# Patient Record
Sex: MALE | Race: WHITE | NOT HISPANIC OR LATINO | Employment: FULL TIME | URBAN - METROPOLITAN AREA
[De-identification: names, ages, dates, MRNs, and addresses within clinical notes are randomized per-mention and may not be internally consistent; named-entity substitution may affect disease eponyms.]

---

## 2021-03-31 ENCOUNTER — RECORDS - HEALTHEAST (OUTPATIENT)
Dept: LAB | Facility: CLINIC | Age: 56
End: 2021-03-31

## 2021-03-31 LAB
CHOLEST SERPL-MCNC: 201 MG/DL
FASTING STATUS PATIENT QL REPORTED: NO
HDLC SERPL-MCNC: 40 MG/DL
LDLC SERPL CALC-MCNC: 126 MG/DL
PSA SERPL-MCNC: 1.3 NG/ML (ref 0–3.5)
TRIGL SERPL-MCNC: 173 MG/DL

## 2021-10-05 ENCOUNTER — THERAPY VISIT (OUTPATIENT)
Dept: PHYSICAL THERAPY | Facility: CLINIC | Age: 56
End: 2021-10-05
Payer: COMMERCIAL

## 2021-10-05 DIAGNOSIS — M54.6 BILATERAL THORACIC BACK PAIN: ICD-10-CM

## 2021-10-05 DIAGNOSIS — M54.2 CERVICALGIA: ICD-10-CM

## 2021-10-05 DIAGNOSIS — M54.50 LUMBAGO: Primary | ICD-10-CM

## 2021-10-05 PROCEDURE — 97161 PT EVAL LOW COMPLEX 20 MIN: CPT | Mod: GP | Performed by: PHYSICAL THERAPIST

## 2021-10-05 PROCEDURE — 97112 NEUROMUSCULAR REEDUCATION: CPT | Mod: GP | Performed by: PHYSICAL THERAPIST

## 2021-10-05 PROCEDURE — 97140 MANUAL THERAPY 1/> REGIONS: CPT | Mod: GP | Performed by: PHYSICAL THERAPIST

## 2021-10-05 NOTE — LETTER
[facility]  [address] [place] [address]  [phone]    2021    Re: Lio Herbert   :   1965  MRN:  [id number]   REFERRING PHYSICIAN:   Levar HERNANDEZ [facility]    Date of Initial Evaluation: 10/5/2021  Visits:  Rxs Used: 1  Reason for Referral:     Cervicalgia  Lumbago  Bilateral thoracic back pain    EVALUATION SUMMARY    Physical Therapy Initial Evaluation  2021     Precautions/Restrictions/MD instructions: PT eval and treat.      Subjective:   Date of Onset: MD order on 9/10/21  C/C: pain and tightness from the base of his neck out to his lateral shoulders and extending into his mid/lower back.  Quality of pain is dull and aching. Pains are described as intermittent in nature. Pain is worse: dependent on activities. Pain is rated 1/10.   History of symptoms: Pains began gradually as the result of taking a few road trips. Since onset, symptoms are improving. Previous episodes: none  Worsened by: driving greater than 1 hour, standing or sitting for greater than 1 hour.   Alleviated by: Movement.    General health as reported by patient: good  Pertinent medical/surgical history: depression, numbness - history in his left heel.    Imaging: none. Current occupational status: artist, teacher, tech director, . Patient's goals are: decrease pain, improve tolerance to sitting, driving, staying in one position. Return to MD:  PRN.   Objective:  CERVICAL:  Re: Lio Herbert   :   1965      Neurological:    Motor Deficit:  Myotomes L R   C4 (shoulder elevation) 5/5 5/5   C5 (shoulder abduction) 5/5 5/5   C6 (elbow flexion) 5/5 5/5   C7 (elbow extension) 5/5 5/5   C8 (thumb extension) 5/5 5/5   T1 (finger add/abd) 5/5 5/5    Strength (lb) WNL WNL     Sensory Deficit, Reflexes, Dural Signs:   Intact to light touch sensation in all UE dermatomes. Brachioradialis,  triceps and biceps reflex 1+ B.    AROM: (Major, Moderate, Minimal or Nil loss)  Movement Loss Cornelio Mod Min Nil Pain   Protrusion    x    Flexion   x     Retraction   x     Extension  x      Left Rotation  62      Right Rotation    72     Left Side Bending   x     Right Side bending   x            Thoracic AROM: (Major, Moderate, Minimal or Nil loss)  Movement Loss Cornelio Mod Min Nil Pain   Flexion   x     Extension x       Rotation L   x     Rotation R  x            AROM: (Major, Moderate, Minimal or Nil loss)  Movement Loss Cornelio Mod Min Nil Pain   Flexion   x     Extension  x      Side Gliding L    x    Side Gliding R    x             Re: Lio Herbert   :   1965    Assessment/Plan:    The patient is a 56 year old male with chief complaint of low back, neck and upper back pain.    The patient has the following significant findings with corresponding treatment plan.  Diagnosis 1:  Neck, back and thoracic pain    Pain -  hot/cold therapy, manual therapy, splint/taping/bracing/orthotics, education, directional preference exercise and home program  Decreased ROM/flexibility - manual therapy and therapeutic exercise  Decreased proprioception - neuro re-education and therapeutic activities  Impaired muscle performance - neuro re-education  Decreased function - therapeutic activities  Impaired posture - neuro re-education      Therapy Evaluation Codes:      Cumulative Therapy Evaluation is: Low complexity.    Previous and current functional limitations:  (See Goal Flow Sheet for this information)    Short term and Long term goals: (See Goal Flow Sheet for this information)     Communication ability:  Patient appears to be able to clearly communicate and understand verbal and written communication and follow directions correctly.  Treatment Explanation - The following has been discussed with the patient: RX ordered/plan of care, anticipated outcomes, and possible risks and side effects.  This patient would benefit  from PT intervention to resume normal activities.   Rehab potential is good.    Frequency:  1 X week, once daily  Duration:  for 4 weeks  Discharge Plan: Achieve all LTGs, be independent in home treatment program, and reach maximal therapeutic benefit.    Thank you for your referral.    INQUIRIES  Therapist: Alia Meza, PT, DPT, [facility] [address] [place] [address]  Phone: [phone]  Fax: [phone]

## 2021-10-05 NOTE — PROGRESS NOTES
Physical Therapy Initial Evaluation  October 5, 2021     Precautions/Restrictions/MD instructions: PT eval and treat.      Subjective:   Date of Onset: MD order on 9/10/21  C/C: pain and tightness from the base of his neck out to his lateral shoulders and extending into his mid/lower back.  Quality of pain is dull and aching. Pains are described as intermittent in nature. Pain is worse: dependent on activities. Pain is rated 1/10.   History of symptoms: Pains began gradually as the result of taking a few road trips. Since onset, symptoms are improving. Previous episodes: none  Worsened by: driving greater than 1 hour, standing or sitting for greater than 1 hour.   Alleviated by: Movement.    General health as reported by patient: good  Pertinent medical/surgical history: depression, numbness - history in his left heel.    Imaging: none. Current occupational status: artist, teacher, tech director, . Patient's goals are: decrease pain, improve tolerance to sitting, driving, staying in one position. Return to MD:  PRN.     Objective:  CERVICAL:        Neurological:    Motor Deficit:  Myotomes L R   C4 (shoulder elevation) 5/5 5/5   C5 (shoulder abduction) 5/5 5/5   C6 (elbow flexion) 5/5 5/5   C7 (elbow extension) 5/5 5/5   C8 (thumb extension) 5/5 5/5   T1 (finger add/abd) 5/5 5/5    Strength (lb) WNL WNL     Sensory Deficit, Reflexes, Dural Signs:   Intact to light touch sensation in all UE dermatomes. Brachioradialis, triceps and biceps reflex 1+ B.    AROM: (Major, Moderate, Minimal or Nil loss)  Movement Loss Cornelio Mod Min Nil Pain   Protrusion    x    Flexion   x     Retraction   x     Extension  x      Left Rotation  62      Right Rotation    72     Left Side Bending   x     Right Side bending   x            Thoracic AROM: (Major, Moderate, Minimal or Nil loss)  Movement Loss Cornelio Mod Min Nil Pain   Flexion   x     Extension x       Rotation L   x     Rotation R  x            AROM: (Major, Moderate,  Minimal or Nil loss)  Movement Loss Corneloi Mod Min Nil Pain   Flexion   x     Extension  x      Side Gliding L    x    Side Gliding R    x       Assessment/Plan:    The patient is a 56 year old male with chief complaint of low back, neck and upper back pain.    The patient has the following significant findings with corresponding treatment plan.  Diagnosis 1:  Neck, back and thoracic pain    Pain -  hot/cold therapy, manual therapy, splint/taping/bracing/orthotics, education, directional preference exercise and home program  Decreased ROM/flexibility - manual therapy and therapeutic exercise  Decreased proprioception - neuro re-education and therapeutic activities  Impaired muscle performance - neuro re-education  Decreased function - therapeutic activities  Impaired posture - neuro re-education           Therapy Evaluation Codes:      Cumulative Therapy Evaluation is: Low complexity.    Previous and current functional limitations:  (See Goal Flow Sheet for this information)    Short term and Long term goals: (See Goal Flow Sheet for this information)     Communication ability:  Patient appears to be able to clearly communicate and understand verbal and written communication and follow directions correctly.  Treatment Explanation - The following has been discussed with the patient: RX ordered/plan of care, anticipated outcomes, and possible risks and side effects.  This patient would benefit from PT intervention to resume normal activities.   Rehab potential is good.    Frequency:  1 X week, once daily  Duration:  for 4 weeks  Discharge Plan: Achieve all LTGs, be independent in home treatment program, and reach maximal therapeutic benefit.    Please refer to the daily flowsheet for treatment today, total treatment time and time spent performing 1:1 timed codes.

## 2021-10-20 ENCOUNTER — THERAPY VISIT (OUTPATIENT)
Dept: PHYSICAL THERAPY | Facility: CLINIC | Age: 56
End: 2021-10-20
Payer: COMMERCIAL

## 2021-10-20 DIAGNOSIS — M54.6 BILATERAL THORACIC BACK PAIN: ICD-10-CM

## 2021-10-20 DIAGNOSIS — M54.50 LUMBAGO: ICD-10-CM

## 2021-10-20 DIAGNOSIS — M54.2 CERVICALGIA: ICD-10-CM

## 2021-10-20 PROCEDURE — 97112 NEUROMUSCULAR REEDUCATION: CPT | Mod: GP | Performed by: PHYSICAL THERAPIST

## 2021-10-20 PROCEDURE — 97110 THERAPEUTIC EXERCISES: CPT | Mod: GP | Performed by: PHYSICAL THERAPIST

## 2021-11-03 ENCOUNTER — THERAPY VISIT (OUTPATIENT)
Dept: PHYSICAL THERAPY | Facility: CLINIC | Age: 56
End: 2021-11-03
Payer: COMMERCIAL

## 2021-11-03 DIAGNOSIS — M54.50 LUMBAGO: ICD-10-CM

## 2021-11-03 DIAGNOSIS — M54.2 CERVICALGIA: ICD-10-CM

## 2021-11-03 DIAGNOSIS — M54.6 BILATERAL THORACIC BACK PAIN: ICD-10-CM

## 2021-11-03 PROCEDURE — 97112 NEUROMUSCULAR REEDUCATION: CPT | Mod: GP | Performed by: PHYSICAL THERAPIST

## 2021-11-03 PROCEDURE — 97110 THERAPEUTIC EXERCISES: CPT | Mod: GP | Performed by: PHYSICAL THERAPIST

## 2021-11-03 PROCEDURE — 97140 MANUAL THERAPY 1/> REGIONS: CPT | Mod: GP | Performed by: PHYSICAL THERAPIST

## 2021-12-12 ENCOUNTER — HEALTH MAINTENANCE LETTER (OUTPATIENT)
Age: 56
End: 2021-12-12

## 2022-03-04 PROBLEM — M54.50 LUMBAGO: Status: RESOLVED | Noted: 2021-10-05 | Resolved: 2022-03-04

## 2022-03-04 PROBLEM — M54.6 BILATERAL THORACIC BACK PAIN: Status: RESOLVED | Noted: 2021-10-05 | Resolved: 2022-03-04

## 2022-03-04 PROBLEM — M54.2 CERVICALGIA: Status: RESOLVED | Noted: 2021-10-05 | Resolved: 2022-03-04

## 2022-03-04 NOTE — PROGRESS NOTES
Discharge Note    Progress reporting period is from initial evaluation date (please see noted date below) to Nov 3, 2021.  No linked episodes      Lio failed to follow up and current status is unknown.  Please see information below for last relevant information on current status.  Patient seen for 3 visits.    SUBJECTIVE  Subjective changes noted by patient:  Upper back and lower back doing well except after lifting and moving a ladder. He felt like the exercises helped to get it under control if it had flared up.  He reports a 65% improvement in symptoms overall.   .  Current pain level is 0/10.     Previous pain level was  3/10.   Changes in function:  Yes (See Goal flowsheet attached for changes in current functional level)  Adverse reaction to treatment or activity: None    OBJECTIVE  Changes noted in objective findings: Flexion/Extension 100%,  L rotation 76 degrees, R 79.   Thoracic: flexion 100% EXT , L rotation 100% R rotation 100%. KWADWO 4%     ASSESSMENT/PLAN  Diagnosis: neck, low back, and thoracic pain   Updated problem list and treatment plan:   Pain - HEP  Decreased ROM/flexibility - HEP  STG/LTGs have been met or progress has been made towards goals:  Yes, please see goal flowsheet for most current information  Assessment of Progress: current status is unknown.    Last current status: Pt is progressing as expected   Self Management Plans:  HEP  I have re-evaluated this patient and find that the nature, scope, duration and intensity of the therapy is appropriate for the medical condition of the patient.  Lio continues to require the following intervention to meet STG and LTG's:  HEP.    Recommendations:  Discharge with current home program.  Patient to follow up with MD as needed.    Please refer to the daily flowsheet for treatment today, total treatment time and time spent performing 1:1 timed codes.

## 2022-05-13 ENCOUNTER — LAB REQUISITION (OUTPATIENT)
Dept: LAB | Facility: CLINIC | Age: 57
End: 2022-05-13

## 2022-05-13 DIAGNOSIS — Z13.1 ENCOUNTER FOR SCREENING FOR DIABETES MELLITUS: ICD-10-CM

## 2022-05-13 DIAGNOSIS — E78.2 MIXED HYPERLIPIDEMIA: ICD-10-CM

## 2022-05-13 LAB
CHOLEST SERPL-MCNC: 229 MG/DL
FASTING STATUS PATIENT QL REPORTED: ABNORMAL
FASTING STATUS PATIENT QL REPORTED: NORMAL
GLUCOSE BLD-MCNC: 101 MG/DL (ref 70–125)
HDLC SERPL-MCNC: 50 MG/DL
LDLC SERPL CALC-MCNC: 158 MG/DL
TRIGL SERPL-MCNC: 107 MG/DL

## 2022-05-13 PROCEDURE — 82947 ASSAY GLUCOSE BLOOD QUANT: CPT | Performed by: FAMILY MEDICINE

## 2022-05-13 PROCEDURE — 80061 LIPID PANEL: CPT | Performed by: FAMILY MEDICINE

## 2022-10-03 ENCOUNTER — HEALTH MAINTENANCE LETTER (OUTPATIENT)
Age: 57
End: 2022-10-03

## 2023-02-11 ENCOUNTER — HEALTH MAINTENANCE LETTER (OUTPATIENT)
Age: 58
End: 2023-02-11

## 2023-09-01 ENCOUNTER — TRANSFERRED RECORDS (OUTPATIENT)
Dept: MULTI SPECIALTY CLINIC | Facility: CLINIC | Age: 58
End: 2023-09-01

## 2023-10-17 ENCOUNTER — LAB REQUISITION (OUTPATIENT)
Dept: LAB | Facility: CLINIC | Age: 58
End: 2023-10-17
Payer: COMMERCIAL

## 2023-10-17 DIAGNOSIS — Z13.1 ENCOUNTER FOR SCREENING FOR DIABETES MELLITUS: ICD-10-CM

## 2023-10-17 DIAGNOSIS — E78.2 MIXED HYPERLIPIDEMIA: ICD-10-CM

## 2023-10-17 LAB
ANION GAP SERPL CALCULATED.3IONS-SCNC: 10 MMOL/L (ref 7–15)
BUN SERPL-MCNC: 12.8 MG/DL (ref 6–20)
CALCIUM SERPL-MCNC: 9.1 MG/DL (ref 8.6–10)
CHLORIDE SERPL-SCNC: 104 MMOL/L (ref 98–107)
CHOLEST SERPL-MCNC: 237 MG/DL
CREAT SERPL-MCNC: 0.8 MG/DL (ref 0.67–1.17)
DEPRECATED HCO3 PLAS-SCNC: 25 MMOL/L (ref 22–29)
EGFRCR SERPLBLD CKD-EPI 2021: >90 ML/MIN/1.73M2
GLUCOSE SERPL-MCNC: 88 MG/DL (ref 70–99)
HDLC SERPL-MCNC: 42 MG/DL
LDLC SERPL CALC-MCNC: 150 MG/DL
NONHDLC SERPL-MCNC: 195 MG/DL
POTASSIUM SERPL-SCNC: 4 MMOL/L (ref 3.4–5.3)
SODIUM SERPL-SCNC: 139 MMOL/L (ref 135–145)
TRIGL SERPL-MCNC: 225 MG/DL

## 2023-10-17 PROCEDURE — 80048 BASIC METABOLIC PNL TOTAL CA: CPT | Mod: ORL | Performed by: PHYSICIAN ASSISTANT

## 2023-10-17 PROCEDURE — 80061 LIPID PANEL: CPT | Mod: ORL | Performed by: PHYSICIAN ASSISTANT

## 2024-03-09 ENCOUNTER — HEALTH MAINTENANCE LETTER (OUTPATIENT)
Age: 59
End: 2024-03-09

## 2024-11-03 SDOH — HEALTH STABILITY: PHYSICAL HEALTH: ON AVERAGE, HOW MANY DAYS PER WEEK DO YOU ENGAGE IN MODERATE TO STRENUOUS EXERCISE (LIKE A BRISK WALK)?: 6 DAYS

## 2024-11-03 SDOH — HEALTH STABILITY: PHYSICAL HEALTH: ON AVERAGE, HOW MANY MINUTES DO YOU ENGAGE IN EXERCISE AT THIS LEVEL?: 30 MIN

## 2024-11-03 ASSESSMENT — SOCIAL DETERMINANTS OF HEALTH (SDOH): HOW OFTEN DO YOU GET TOGETHER WITH FRIENDS OR RELATIVES?: MORE THAN THREE TIMES A WEEK

## 2024-11-04 ENCOUNTER — OFFICE VISIT (OUTPATIENT)
Dept: FAMILY MEDICINE | Facility: CLINIC | Age: 59
End: 2024-11-04
Payer: COMMERCIAL

## 2024-11-04 VITALS
DIASTOLIC BLOOD PRESSURE: 82 MMHG | SYSTOLIC BLOOD PRESSURE: 130 MMHG | RESPIRATION RATE: 17 BRPM | HEIGHT: 72 IN | TEMPERATURE: 97.5 F | BODY MASS INDEX: 22.17 KG/M2 | OXYGEN SATURATION: 100 % | WEIGHT: 163.7 LBS | HEART RATE: 77 BPM

## 2024-11-04 DIAGNOSIS — K40.90 LEFT INGUINAL HERNIA: ICD-10-CM

## 2024-11-04 DIAGNOSIS — Z00.00 ROUTINE GENERAL MEDICAL EXAMINATION AT A HEALTH CARE FACILITY: Primary | ICD-10-CM

## 2024-11-04 DIAGNOSIS — F33.42 MAJOR DEPRESSIVE DISORDER, RECURRENT EPISODE, IN FULL REMISSION (H): ICD-10-CM

## 2024-11-04 DIAGNOSIS — Z12.5 SCREENING PSA (PROSTATE SPECIFIC ANTIGEN): ICD-10-CM

## 2024-11-04 DIAGNOSIS — H61.23 BILATERAL IMPACTED CERUMEN: ICD-10-CM

## 2024-11-04 PROCEDURE — 69209 REMOVE IMPACTED EAR WAX UNI: CPT | Mod: 50 | Performed by: PHYSICIAN ASSISTANT

## 2024-11-04 PROCEDURE — 90480 ADMN SARSCOV2 VAC 1/ONLY CMP: CPT | Performed by: PHYSICIAN ASSISTANT

## 2024-11-04 PROCEDURE — 80061 LIPID PANEL: CPT | Performed by: PHYSICIAN ASSISTANT

## 2024-11-04 PROCEDURE — G0103 PSA SCREENING: HCPCS | Performed by: PHYSICIAN ASSISTANT

## 2024-11-04 PROCEDURE — 36415 COLL VENOUS BLD VENIPUNCTURE: CPT | Performed by: PHYSICIAN ASSISTANT

## 2024-11-04 PROCEDURE — 91320 SARSCV2 VAC 30MCG TRS-SUC IM: CPT | Performed by: PHYSICIAN ASSISTANT

## 2024-11-04 PROCEDURE — 99213 OFFICE O/P EST LOW 20 MIN: CPT | Mod: 25 | Performed by: PHYSICIAN ASSISTANT

## 2024-11-04 PROCEDURE — 90471 IMMUNIZATION ADMIN: CPT | Performed by: PHYSICIAN ASSISTANT

## 2024-11-04 PROCEDURE — 99386 PREV VISIT NEW AGE 40-64: CPT | Mod: 25 | Performed by: PHYSICIAN ASSISTANT

## 2024-11-04 PROCEDURE — 90673 RIV3 VACCINE NO PRESERV IM: CPT | Performed by: PHYSICIAN ASSISTANT

## 2024-11-04 PROCEDURE — 80048 BASIC METABOLIC PNL TOTAL CA: CPT | Performed by: PHYSICIAN ASSISTANT

## 2024-11-04 RX ORDER — CITALOPRAM HYDROBROMIDE 20 MG/1
20 TABLET ORAL DAILY
COMMUNITY
End: 2024-11-04

## 2024-11-04 RX ORDER — CITALOPRAM HYDROBROMIDE 20 MG/1
20 TABLET ORAL DAILY
Qty: 90 TABLET | Refills: 3 | Status: SHIPPED | OUTPATIENT
Start: 2024-11-04

## 2024-11-04 ASSESSMENT — PAIN SCALES - GENERAL: PAINLEVEL_OUTOF10: NO PAIN (0)

## 2024-11-04 NOTE — PROGRESS NOTES
Preventive Care Visit  [facility]  Nick Land PA-C, Physician Assistant  Nov 4, 2024      Assessment & Plan     (Z00.00) Routine general medical examination at a health care facility  (primary encounter diagnosis)  Comment:   Plan: Basic metabolic panel  (Ca, Cl, CO2, Creat,         Gluc, K, Na, BUN), Lipid panel reflex to direct        LDL Fasting        Labs and vaccines updated today, no new concerns, all chronic conditions stable    (Z12.5) Screening PSA (prostate specific antigen)  Comment:   Plan: PSA, screen            (K40.90) Left inguinal hernia  Comment:   Plan: not interested in repair at this time, he will call when wanting evaluation, advised on red flag symptoms.    (F33.42) Major depressive disorder, recurrent episode, in full remission (H)  Comment:   Plan: citalopram (CELEXA) 20 MG tablet              Chronic condition well-controlled today, no medication changes made, follow-up in 1 year but sooner if any worsening symptoms.      (H61.23) Bilateral impacted cerumen  Comment:   Plan: OK REMOVAL IMPACTED CERUMEN IRRIGATION/LVG         UNILAT        See procedure note    Patient has been advised of split billing requirements and indicates understanding: Yes        Counseling  Appropriate preventive services were addressed with this patient via screening, questionnaire, or discussion as appropriate for fall prevention, nutrition, physical activity, Tobacco-use cessation, social engagement, weight loss and cognition.  Checklist reviewing preventive services available has been given to the patient.  Reviewed patient's diet, addressing concerns and/or questions.   He is at risk for psychosocial distress and has been provided with information to reduce risk.           Juan Miguel Vargas is a 59 year old, presenting for the following:  Physical (Flu and covid, refills )        11/4/2024     2:30 PM   Additional Questions   Roomed by Brigitte MCNEILL   Accompanied by self          11/4/2024     2:30 PM   Patient Reported Additional Medications   Patient reports taking the following new medications none        Via the Health Maintenance questionnaire, the patient has reported the following services have been completed -Colonscopy: [facility] 2023-09-01, this information has been sent to the abstraction team.    HPI      Depression   How are you doing with your depression since your last visit? No change  Are you having other symptoms that might be associated with depression? No  Have you had a significant life event?  No   Are you feeling anxious or having panic attacks?   No  Do you have any concerns with your use of alcohol or other drugs? No    Stable on citalopram 20 mg for many years, no new concerns.  Social History     Tobacco Use    Smoking status: Never    Smokeless tobacco: Never   Vaping Use    Vaping status: Never Used   Substance Use Topics    Alcohol use: Yes     Comment: once per month          No data to display                   No data to display                  Suicide Assessment Five-step Evaluation and Treatment (SAFE-T)      Health Care Directive  Patient does not have a Health Care Directive: Discussed advance care planning with patient; however, patient declined at this time.      11/3/2024   General Health   How would you rate your overall physical health? Good   Feel stress (tense, anxious, or unable to sleep) Only a little      (!) STRESS CONCERN      11/3/2024   Nutrition   Three or more servings of calcium each day? Yes   Diet: Regular (no restrictions)   How many servings of fruit and vegetables per day? (!) 2-3   How many sweetened beverages each day? 0-1            11/3/2024   Exercise   Days per week of moderate/strenous exercise 6 days   Average minutes spent exercising at this level 30 min            11/3/2024   Social Factors   Frequency of gathering with friends or relatives More than three times a week   Worry food won't last until get money to buy more No    Food not last or not have enough money for food? No   Do you have housing? (Housing is defined as stable permanent housing and does not include staying ouside in a car, in a tent, in an abandoned building, in an overnight shelter, or couch-surfing.) Yes   Are you worried about losing your housing? No   Lack of transportation? No   Unable to get utilities (heat,electricity)? No            11/3/2024   Fall Risk   Fallen 2 or more times in the past year? No    Trouble with walking or balance? No        Patient-reported          11/3/2024   Dental   Dentist two times every year? (!) DECLINE            11/3/2024   TB Screening   Were you born outside of the [place]? No      Today's PHQ-2 Score:       11/3/2024     4:21 PM   PHQ-2 ( 1999 [employer])   Q1: Little interest or pleasure in doing things 0    Q2: Feeling down, depressed or hopeless 0    PHQ-2 Score 0    Q1: Little interest or pleasure in doing things Not at all   Q2: Feeling down, depressed or hopeless Not at all   PHQ-2 Score 0       Patient-reported           11/3/2024   Substance Use   Alcohol more than 3/day or more than 7/wk No   Do you use any other substances recreationally? No        Social History     Tobacco Use    Smoking status: Never    Smokeless tobacco: Never   Vaping Use    Vaping status: Never Used             11/3/2024   One time HIV Screening   Previous HIV test? No          11/3/2024   STI Screening   New sexual partner(s) since last STI/HIV test? No      Last PSA:   Prostate Specific Antigen Screen   Date Value Ref Range Status   03/31/2021 1.3 0.0 - 3.5 ng/mL Final     ASCVD Risk   The ASCVD Risk score (Margarita MOJICA, et al., 2019) failed to calculate for the following reasons:    The systolic blood pressure is missing           Reviewed and updated as needed this visit by Provider                    BP Readings from Last 3 Encounters:   11/04/24 130/82    Wt Readings from Last 3 Encounters:   11/04/24 74.3 kg (163 lb 11.2 oz)                 Objective    Exam  There were no vitals taken for this visit.   There is no height or weight on file to calculate BMI.    Physical Exam  GENERAL: alert and no distress  EYES: Eyes grossly normal to inspection, PERRL and conjunctivae and sclerae normal  HENT: ear canals occluded with wax BL, nose and mouth without ulcers or lesions  NECK: no adenopathy, no asymmetry, masses, or scars  RESP: lungs clear to auscultation - no rales, rhonchi or wheezes  CV: regular rate and rhythm, normal S1 S2, no S3 or S4, no murmur, click or rub, no peripheral edema  ABDOMEN: soft, nontender, no hepatosplenomegaly, no masses and bowel sounds normal  MS: no gross musculoskeletal defects noted, no edema  SKIN: no suspicious lesions or rashes  NEURO: Normal strength and tone, mentation intact and speech normal  PSYCH: mentation appears normal, affect normal/bright      Cerumen removal procedure note:  After obtaining verbal consent, both ear canals were irrigated with water, cerumen spoon was used to remove any additional wax from the ear canal, following the procedure bilateral tympanic membranes were intact and canals were with normal appearance. Patient tolerated the procedure well    Signed Electronically by: Nick Land PA-C

## 2024-11-04 NOTE — PATIENT INSTRUCTIONS
Patient Education   Preventive Care Advice   This is general advice given by our system to help you stay healthy. However, your care team may have specific advice just for you. Please talk to your care team about your preventive care needs.  Nutrition  Eat 5 or more servings of fruits and vegetables each day.  Try wheat bread, brown rice and whole grain pasta (instead of white bread, rice, and pasta).  Get enough calcium and vitamin D. Check the label on foods and aim for 100% of the RDA (recommended daily allowance).  Lifestyle  Exercise at least 150 minutes each week  (30 minutes a day, 5 days a week).  Do muscle strengthening activities 2 days a week. These help control your weight and prevent disease.  No smoking.  Wear sunscreen to prevent skin cancer.  Have a dental exam and cleaning every 6 months.  Yearly exams  See your health care team every year to talk about:  Any changes in your health.  Any medicines your care team has prescribed.  Preventive care, family planning, and ways to prevent chronic diseases.  Shots (vaccines)   HPV shots (up to age 26), if you've never had them before.  Hepatitis B shots (up to age 59), if you've never had them before.  COVID-19 shot: Get this shot when it's due.  Flu shot: Get a flu shot every year.  Tetanus shot: Get a tetanus shot every 10 years.  Pneumococcal, hepatitis A, and RSV shots: Ask your care team if you need these based on your risk.  Shingles shot (for age 50 and up)  General health tests  Diabetes screening:  Starting at age 35, Get screened for diabetes at least every 3 years.  If you are younger than age 35, ask your care team if you should be screened for diabetes.  Cholesterol test: At age 39, start having a cholesterol test every 5 years, or more often if advised.  Bone density scan (DEXA): At age 50, ask your care team if you should have this scan for osteoporosis (brittle bones).  Hepatitis C: Get tested at least once in your life.  STIs (sexually  transmitted infections)  Before age 24: Ask your care team if you should be screened for STIs.  After age 24: Get screened for STIs if you're at risk. You are at risk for STIs (including HIV) if:  You are sexually active with more than one person.  You don't use condoms every time.  You or a partner was diagnosed with a sexually transmitted infection.  If you are at risk for HIV, ask about PrEP medicine to prevent HIV.  Get tested for HIV at least once in your life, whether you are at risk for HIV or not.  Cancer screening tests  Cervical cancer screening: If you have a cervix, begin getting regular cervical cancer screening tests starting at age 21.  Breast cancer scan (mammogram): If you've ever had breasts, begin having regular mammograms starting at age 40. This is a scan to check for breast cancer.  Colon cancer screening: It is important to start screening for colon cancer at age 45.  Have a colonoscopy test every 10 years (or more often if you're at risk) Or, ask your provider about stool tests like a FIT test every year or Cologuard test every 3 years.  To learn more about your testing options, visit:   .  For help making a decision, visit:   https://bit.ly/gw55390.  Prostate cancer screening test: If you have a prostate, ask your care team if a prostate cancer screening test (PSA) at age 55 is right for you.  Lung cancer screening: If you are a current or former smoker ages 50 to 80, ask your care team if ongoing lung cancer screenings are right for you.  For informational purposes only. Not to replace the advice of your health care provider. Copyright   2023 [employer]. All rights reserved. Clinically reviewed by the [facility]. [employer] [id number] - REV 01/24.

## 2024-11-05 LAB
ANION GAP SERPL CALCULATED.3IONS-SCNC: 10 MMOL/L (ref 7–15)
BUN SERPL-MCNC: 12.9 MG/DL (ref 6–20)
CALCIUM SERPL-MCNC: 9 MG/DL (ref 8.8–10.4)
CHLORIDE SERPL-SCNC: 104 MMOL/L (ref 98–107)
CHOLEST SERPL-MCNC: 201 MG/DL
CREAT SERPL-MCNC: 0.97 MG/DL (ref 0.67–1.17)
EGFRCR SERPLBLD CKD-EPI 2021: >90 ML/MIN/1.73M2
FASTING STATUS PATIENT QL REPORTED: NO
FASTING STATUS PATIENT QL REPORTED: NO
GLUCOSE SERPL-MCNC: 90 MG/DL (ref 70–99)
HCO3 SERPL-SCNC: 26 MMOL/L (ref 22–29)
HDLC SERPL-MCNC: 45 MG/DL
LDLC SERPL CALC-MCNC: 137 MG/DL
NONHDLC SERPL-MCNC: 156 MG/DL
POTASSIUM SERPL-SCNC: 4.2 MMOL/L (ref 3.4–5.3)
PSA SERPL DL<=0.01 NG/ML-MCNC: 2.47 NG/ML (ref 0–3.5)
SODIUM SERPL-SCNC: 140 MMOL/L (ref 135–145)
TRIGL SERPL-MCNC: 97 MG/DL